# Patient Record
Sex: MALE | Race: WHITE | NOT HISPANIC OR LATINO | Employment: OTHER | URBAN - NONMETROPOLITAN AREA
[De-identification: names, ages, dates, MRNs, and addresses within clinical notes are randomized per-mention and may not be internally consistent; named-entity substitution may affect disease eponyms.]

---

## 2017-02-02 ENCOUNTER — OFFICE VISIT (OUTPATIENT)
Dept: CARDIOLOGY | Facility: CLINIC | Age: 82
End: 2017-02-02
Payer: MEDICARE

## 2017-02-02 VITALS
WEIGHT: 197 LBS | HEART RATE: 80 BPM | BODY MASS INDEX: 26.68 KG/M2 | DIASTOLIC BLOOD PRESSURE: 88 MMHG | HEIGHT: 72 IN | SYSTOLIC BLOOD PRESSURE: 142 MMHG | OXYGEN SATURATION: 90 %

## 2017-02-02 DIAGNOSIS — I10 ESSENTIAL HYPERTENSION, BENIGN: ICD-10-CM

## 2017-02-02 DIAGNOSIS — I50.32 CHF (CONGESTIVE HEART FAILURE), NYHA CLASS III, CHRONIC, DIASTOLIC (HCC): ICD-10-CM

## 2017-02-02 DIAGNOSIS — I48.21 PERMANENT ATRIAL FIBRILLATION (HCC): ICD-10-CM

## 2017-02-02 DIAGNOSIS — I35.0 AORTIC STENOSIS, MODERATE: ICD-10-CM

## 2017-02-02 DIAGNOSIS — I34.0 MODERATE MITRAL REGURGITATION: ICD-10-CM

## 2017-02-02 PROBLEM — I50.9 CHF (CONGESTIVE HEART FAILURE), NYHA CLASS III (HCC): Status: ACTIVE | Noted: 2017-02-02

## 2017-02-02 PROCEDURE — G8420 CALC BMI NORM PARAMETERS: HCPCS | Performed by: INTERNAL MEDICINE

## 2017-02-02 PROCEDURE — 99204 OFFICE O/P NEW MOD 45 MIN: CPT | Performed by: INTERNAL MEDICINE

## 2017-02-02 PROCEDURE — 1101F PT FALLS ASSESS-DOCD LE1/YR: CPT | Mod: 8P | Performed by: INTERNAL MEDICINE

## 2017-02-02 RX ORDER — FUROSEMIDE 20 MG/1
40 TABLET ORAL DAILY
Qty: 60 TAB | Refills: 6 | Status: SHIPPED | OUTPATIENT
Start: 2017-02-02 | End: 2018-05-09 | Stop reason: SDUPTHER

## 2017-02-02 RX ORDER — LEVOTHYROXINE SODIUM 112 UG/1
112 TABLET ORAL
COMMUNITY

## 2017-02-02 RX ORDER — POTASSIUM CHLORIDE 20 MEQ/1
20 TABLET, EXTENDED RELEASE ORAL 2 TIMES DAILY
Qty: 60 TAB | Refills: 11 | Status: SHIPPED | OUTPATIENT
Start: 2017-02-02 | End: 2018-02-14 | Stop reason: SDUPTHER

## 2017-02-02 RX ORDER — LANOLIN ALCOHOL/MO/W.PET/CERES
3 CREAM (GRAM) TOPICAL
COMMUNITY
End: 2018-03-14

## 2017-02-02 ASSESSMENT — ENCOUNTER SYMPTOMS
BRUISES/BLEEDS EASILY: 0
PND: 0
FALLS: 0
PALPITATIONS: 0
NAUSEA: 0
CHILLS: 0
CLAUDICATION: 0
FEVER: 0
SHORTNESS OF BREATH: 1
DIZZINESS: 1
ABDOMINAL PAIN: 0
LOSS OF CONSCIOUSNESS: 0
HEADACHES: 0
FOCAL WEAKNESS: 0
WEAKNESS: 0
SORE THROAT: 0
BLURRED VISION: 0
COUGH: 0

## 2017-02-02 NOTE — MR AVS SNAPSHOT
"        Reynaldo EASTON Khai   2017 2:00 PM   Office Visit   MRN: [id number]    Department:  Heart [facility]   Dept Phone:  [phone]    Description:  Male : 1928   Provider:  Momo Coon M.D.           Reason for Visit     New Patient           Allergies as of 2017     No Known Allergies      You were diagnosed with     CHF (congestive heart failure), NYHA class III, chronic, diastolic (CMS-HCC)   [[id number]]         Vital Signs     Blood Pressure Pulse Height Weight Body Mass Index Oxygen Saturation    142/88 mmHg 80 1.829 m (6' 0.01\") 89.359 kg (197 lb) 26.71 kg/m2 90%    Smoking Status                   Never Smoker            Basic Information     Date Of Birth Sex Race Ethnicity Preferred Language    1928 Male Unknown Unknown English      Your appointments     Mar 08, 2017 10:00 AM   15 Minute with Espinoza Syed M.D.   [facility] (--)    [address]  [facility] [address]   [phone]              Problem List              ICD-10-CM Priority Class Noted - Resolved    OSTEOPOROSIS    Unknown - Present    Diabetes mellitus, type II (CMS-HCC) E11.9   Unknown - Present    Hypertension I10   Unknown - Present    Parkinson's syndrome (CMS-HCC) G20   Unknown - Present    Hyperlipidemia E78.5   2014 - Present    Hypothyroidism E03.9   2014 - Present    DM (diabetes mellitus) (CMS-HCC) E11.9   2014 - Present    PAF (paroxysmal atrial fibrillation) (CMS-HCC) I48.0   2014 - Present    Aortic stenosis I35.0   2014 - Present      Health Maintenance        Date Due Completion Dates    DIABETES MONOFILAMENT / LE EXAM 1929 ---    FASTING LIPID PROFILE 1946 ---    URINE ACR / MICROALBUMIN 1946 ---    IMM DTaP/Tdap/Td Vaccine (1 - Tdap) 1947 ---    COLONOSCOPY 1978 ---    IMM ZOSTER VACCINE 1988 ---    A1C SCREENING 2013 (Done)    Override on 2013: Done    SERUM CREATININE " 2/22/2014 2/22/2013 (Done)    Override on 2/22/2013: Done    IMM PNEUMOCOCCAL 65+ (ADULT) LOW/MEDIUM RISK SERIES (2 of 2 - PCV13) 2/22/2014 2/22/2013    RETINAL SCREENING 2/28/2014 2/28/2013 (Done)    Override on 2/28/2013: Done (Roderick exact date unk.)    IMM INFLUENZA (1) 9/1/2016 ---            Current Immunizations     Pneumococcal polysaccharide vaccine (PPSV-23) 2/22/2013      Below and/or attached are the medications your provider expects you to take. Review all of your home medications and newly ordered medications with your provider and/or pharmacist. Follow medication instructions as directed by your provider and/or pharmacist. Please keep your medication list with you and share with your provider. Update the information when medications are discontinued, doses are changed, or new medications (including over-the-counter products) are added; and carry medication information at all times in the event of emergency situations     Allergies:  No Known Allergies          Medications  Valid as of: February 02, 2017 -  3:43 PM    Generic Name Brand Name Tablet Size Instructions for use    Alendronate Sodium (Tab) FOSAMAX 70 MG Take 70 mg by mouth every 7 days.        Aspirin (Tablet Delayed Response) ECOTRIN 81 MG Take 162 mg by mouth every day.        Aspirin (Tab)  MG Take 325 mg by mouth every 6 hours as needed.        Calcium Carbonate-Vitamin D (Tab) Calcium Carbonate-Vitamin D 600-200 MG-UNIT Take  by mouth every day.        Carbidopa-Levodopa (Tab) SINEMET  MG Take 1 Tab by mouth 4 times a day.        Carbidopa-Levodopa (Tab CR) SINEMET CR  MG Take 1 Tab by mouth every bedtime.        Cholecalciferol (Cap) Vitamin D3 1000 UNITS Take  by mouth.        Cyanocobalamin (Cap) B-12 1000 MCG Take 1,000 mcg by mouth every day.        Docusate Calcium   Take  by mouth.        Furosemide (Tab) LASIX 20 MG Take 2 Tabs by mouth every day. 40 mg daily for a week then 20 mg daily        GlipiZIDE  (Tab) GLUCOTROL 5 MG Take 5 mg by mouth 2 times a day. 1 am and 1/2 pm        GlyBURIDE (Tab) DIABETA 5 MG Take 5 mg by mouth every morning.        GlyBURIDE (Tab) DIABETA 2.5 MG Take 2.5 mg by mouth every evening.        Ketoconazole (Cream) NIZORAL 2 % Bid to area        Levothyroxine Sodium (Tab) SYNTHROID 88 MCG Take 88 mcg by mouth every day. 1/2 tab every morning        Levothyroxine Sodium (Tab) SYNTHROID 112 MCG Take 112 mcg by mouth Every morning on an empty stomach.        Melatonin (Tab) melatonin 3 MG Take 3 mg by mouth every bedtime.        MetFORMIN HCl (Tab) GLUCOPHAGE 1000 MG Take 1,000 mg by mouth 2 times a day, with meals.        Multiple Vitamins-Minerals   Take  by mouth.        Polyethylene Glycol 3350 (Pack) MIRALAX  Take 17 g by mouth every day.        Potassium Chloride Rhiannon CR (Tab CR) Kdur 20 MEQ Take 1 Tab by mouth 2 times a day.        RaNITidine HCl (Tab) ZANTAC 150 MG Take 150 mg by mouth 2 times a day.        Rivastigmine (PATCH 24 HR) EXELON 4.6 MG/24HR Apply 1 Patch to skin as directed every day.        Simvastatin (Tab) ZOCOR 10 MG Take 10 mg by mouth every evening.        Valerian   Take  by mouth.        .                 Medicines prescribed today were sent to:     [facility] [address] [address]    Phone: [phone] Fax: [phone]    Open 24 Hours?: No      Medication refill instructions:       If your prescription bottle indicates you have medication refills left, it is not necessary to call your provider’s office. Please contact your pharmacy and they will refill your medication.    If your prescription bottle indicates you do not have any refills left, you may request refills at any time through one of the following ways: The online [employer] system (except Urgent Care), by calling your provider’s office, or by asking your pharmacy to contact your provider’s office with a refill request. Medication  refills are processed only during regular business hours and may not be available until the next business day. Your provider may request additional information or to have a follow-up visit with you prior to refilling your medication.   *Please Note: Medication refills are assigned a new Rx number when refilled electronically. Your pharmacy may indicate that no refills were authorized even though a new prescription for the same medication is available at the pharmacy. Please request the medicine by name with the pharmacy before contacting your provider for a refill.        Instructions    Start furosemide (lasix) with potassium    Goal weight is 180-185              Hiveoo Access Code: [email or web address]  Expires: 2/5/2017 11:48 AM    Your email address is not on file at [facility].  Email Addresses are required for you to sign up for Hiveoo, please contact [phone] to verify your personal information and to provide your email address prior to attempting to register for Hiveoo.    Reynaldo Ridley  [address] [place] [address]    Hiveoo  A secure, online tool to manage your health information     [facility]’s Hiveoo® is a secure, online tool that connects you to your personalized health information from the privacy of your home -- day or night - making it very easy for you to manage your healthcare. Once the activation process is completed, you can even access your medical information using the Hiveoo sreekanth, which is available for free in the Apple Sreekanth store or Google Play store.     To learn more about Hiveoo, visit [email or web address]    There are two levels of access available (as shown below):   My Chart Features  [facility] Primary Care Doctor [facility]  Specialists [facility]  Urgent  Care Non-[facility] Primary Care Doctor   Email your healthcare team securely and privately 24/7 X X X    Manage appointments: schedule your next appointment; view details of past/upcoming appointments X       Request prescription refills. X      View recent personal medical records, including lab and immunizations X X X X   View health record, including health history, allergies, medications X X X X   Read reports about your outpatient visits, procedures, consult and ER notes X X X X   See your discharge summary, which is a recap of your hospital and/or ER visit that includes your diagnosis, lab results, and care plan X X  X     How to register for UGOBE:  Once your e-mail address has been verified, follow the following steps to sign up for UGOBE.     1. Go to  [email or web address]  2. Click on the Sign Up Now box, which takes you to the New Member Sign Up page. You will need to provide the following information:  a. Enter your UGOBE Access Code exactly as it appears at the top of this page. (You will not need to use this code after you’ve completed the sign-up process. If you do not sign up before the expiration date, you must request a new code.)   b. Enter your date of birth.   c. Enter your home email address.   d. Click Submit, and follow the next screen’s instructions.  3. Create a UGOBE ID. This will be your UGOBE login ID and cannot be changed, so think of one that is secure and easy to remember.  4. Create a UGOBE password. You can change your password at any time.  5. Enter your Password Reset Question and Answer. This can be used at a later time if you forget your password.   6. Enter your e-mail address. This allows you to receive e-mail notifications when new information is available in UGOBE.  7. Click Sign Up. You can now view your health information.    For assistance activating your UGOBE account, call [phone]

## 2017-02-02 NOTE — Clinical Note
[facility] [address],   [address] [place] [address]  Phone: [phone]  Fax: [phone]              Reynaldo Ridley  12/31/1928    Encounter Date: 2/2/2017    Momo Coon M.D.          PROGRESS NOTE:  Subjective:   Reynaldo Ridley is a 88 y.o. male who presents today for follow-up of his moderate mixed valvular disease he had last seen in cardiology little less than 3 years ago.    He is known to have moderate aortic stenosis and on recent echocardiogram he has the same, he has moderate mitral regurgitation with pulmonary venous hypertension and moderate tricuspid regurgitation and has had progressive leg swelling causing some fatigue.    He has long-term Parkinson's disease [facility]    He has had atrial fibrillation for at least 3 years he believes he wasn't recommended anticoagulation    Past Medical History   Diagnosis Date   • Vertigo      [facility]   • CATARACT    • Diabetes mellitus type II    • Moderate aortic valve stenosis 2/13     ECHO   • Hypertension    • Ulcer (CMS-[facility])    • Unspecified hypothyroidism    • Environmental allergies    • Melanoma (CMS-[facility])      head   • OSTEOPOROSIS    • Cognitive impairment    • Parkinson's syndrome (CMS-[facility])      [place]   • Hyperlipidemia 2/21/2014   • DM (diabetes mellitus) (CMS-[facility]) 2/21/2014   • PAF (paroxysmal atrial fibrillation) (CMS-[facility]) 2/21/2014   • Aortic stenosis 2/21/2014   • Aortic stenosis, moderate 2/21/2014   • Permanent atrial fibrillation (CMS-[facility]) 2/21/2014     Past Surgical History   Procedure Laterality Date   • Appendectomy       Family History   Problem Relation Age of Onset   • Depression Daughter    • GI Father      bowel obstruction   • Hypertension Mother    • Hypertension     • Arthritis       History   Smoking status   • Never Smoker    Smokeless tobacco   • Never Used     No Known Allergies  Outpatient Encounter  Prescriptions as of 2/2/2017   Medication Sig Dispense Refill   • melatonin 3 MG Tab Take 3 mg by mouth every bedtime.     • levothyroxine (SYNTHROID) 112 MCG Tab Take 112 mcg by mouth Every morning on an empty stomach.     • Cholecalciferol (VITAMIN D3) 1000 UNITS Cap Take  by mouth.     • VALERIAN ROOT PO Take  by mouth.     • potassium chloride SA (KDUR) 20 MEQ Tab CR Take 1 Tab by mouth 2 times a day. 60 Tab 11   • furosemide (LASIX) 20 MG Tab Take 2 Tabs by mouth every day. 40 mg daily for a week then 20 mg daily 60 Tab 6   • glipiZIDE (GLUCOTROL) 5 MG Tab Take 5 mg by mouth 2 times a day. 1 am and 1/2 pm     • aspirin (ASA) 325 MG Tab Take 325 mg by mouth every 6 hours as needed.     • Docusate Calcium (STOOL SOFTENER PO) Take  by mouth.     • metformin (GLUCOPHAGE) 1000 MG tablet Take 1,000 mg by mouth 2 times a day, with meals.     • ranitidine (ZANTAC) 150 MG TABS Take 150 mg by mouth 2 times a day.     • carbidopa-levodopa (SINEMET)  MG TABS Take 1 Tab by mouth 4 times a day.     • carbidopa-levodopa SR (SINEMET CR)  MG per tablet Take 1 Tab by mouth every bedtime.     • rivastigmine (EXELON) 4.6 MG/24HR patch Apply 1 Patch to skin as directed every day.     • Cyanocobalamin (B-12) 1000 MCG CAPS Take 1,000 mcg by mouth every day.     • ketoconazole (NIZORAL) 2 % Cream Bid to area 60 g 1   • polyethylene glycol/lytes (MIRALAX) Pack Take 17 g by mouth every day.     • glyBURIDE (DIABETA) 2.5 MG TABS Take 2.5 mg by mouth every evening.     • levothyroxine (SYNTHROID) 88 MCG TABS Take 88 mcg by mouth every day. 1/2 tab every morning     • alendronate (FOSAMAX) 70 MG TABS Take 70 mg by mouth every 7 days.     • glyBURIDE (DIABETA) 5 MG TABS Take 5 mg by mouth every morning.     • simvastatin (ZOCOR) 10 MG TABS Take 10 mg by mouth every evening.     • aspirin EC (ECOTRIN) 81 MG TBEC Take 162 mg by mouth every day.     • Calcium Carbonate-Vitamin D (CALCIUM + D) 600-200 MG-UNIT TABS Take  by mouth  "every day.     • Multiple Vitamins-Minerals (MULTIVITAMIN PO) Take  by mouth.       No facility-administered encounter medications on file as of 2/2/2017.     Review of Systems   Constitutional: Positive for malaise/fatigue. Negative for fever and chills.   HENT: Negative for sore throat.    Eyes: Negative for blurred vision.   Respiratory: Positive for shortness of breath. Negative for cough.    Cardiovascular: Positive for leg swelling. Negative for chest pain, palpitations, claudication and PND.   Gastrointestinal: Negative for nausea and abdominal pain.   Musculoskeletal: Negative for falls.   Skin: Negative for rash.   Neurological: Positive for dizziness. Negative for focal weakness, loss of consciousness, weakness and headaches.   Endo/Heme/Allergies: Does not bruise/bleed easily.        Objective:   /88 mmHg  Pulse 80  Ht 1.829 m (6' 0.01\")  Wt 89.359 kg (197 lb)  BMI 26.71 kg/m2  SpO2 90%    Physical Exam   Constitutional: No distress.   HENT:   Mouth/Throat: Oropharynx is clear and moist.   Eyes: No scleral icterus.   Neck: Neck supple. No JVD present.   Cardiovascular: Normal rate and intact distal pulses.  An irregularly irregular rhythm present. Exam reveals no gallop and no friction rub.    Murmur heard.  Pulmonary/Chest: Effort normal. He has no rales.   Abdominal: Soft. Bowel sounds are normal. There is no tenderness.   Musculoskeletal: He exhibits no edema.   Neurological: He is alert.   Skin: No rash noted. He is not diaphoretic.   Psychiatric: He has a normal mood and affect.     I reviewed the images from his recent echocardiogram which showed calcified valves causing moderate aortic stenosis, mild aortic regurgitation, moderate mitral regurgitation, moderate tricuspid regurgitation with elevated pulmonary pressures estimated around 80    I don't have recent lab work [facility]    Assessment:     1. CHF (congestive heart failure), NYHA class III, chronic, " diastolic (CMS-[facility])  potassium chloride SA (KDUR) 20 MEQ Tab CR    furosemide (LASIX) 20 MG Tab   2. Moderate mitral regurgitation     3. Aortic stenosis, moderate     4. Permanent atrial fibrillation (CMS-[facility])     5. Essential hypertension, benign         Medical Decision Making:  Today's Assessment / Status / Plan:     It was my pleasure to meet with Mr. Ridley.    He is accompanied by his wife and daughter    His symptoms are most consistent with mixed valvular disease likely most importantly moderate mitral regurgitation which has led to pulmonary venous congestion and right heart failure symptoms with preserved ejection fraction.  We'll start him on diuretic he should lose weight according to await records his ideal weight is likely around 180 pounds so he has about 70 pounds to lose.    Given his moderate [id number] score he is definitely recommended anticoagulation. He agrees with this and [facility].  Given his valvular disease is moderate he could safely start one of the newer targeted oral anticoagulants such as Pradaxa or Eliquis.  You could also take Coumadin but is difficult to get out of the house so will need home monitoring likely.  I don't have his kidney function so certainly this will need to be checked for decision on dosing of the other agents.    I will see Mr. Ridley back in 3 months time and encouraged him to follow up with us over the phone or e-mail using my MyChart as issues arise.    It is my pleasure to participate in the care of Mr. Ridley.  Please do not hesitate to contact me with questions or concerns.    Momo Coon MD PhD [facility]  Cardiologist [facility]            Peyton Hair M.D.  [address]  [address]  [facility] [address]  VIA Facsimile: [phone]     Espinoza Syed M.D.  [address] [address]  VIA In Basket

## 2017-02-03 NOTE — PROGRESS NOTES
Subjective:   Reynaldo Ridley is a 88 y.o. male who presents today for follow-up of his moderate mixed valvular disease he had last seen in cardiology little less than 3 years ago.    He is known to have moderate aortic stenosis and on recent echocardiogram he has the same, he has moderate mitral regurgitation with pulmonary venous hypertension and moderate tricuspid regurgitation and has had progressive leg swelling causing some fatigue.    He has long-term Parkinson's disease and [facility]    He has had atrial fibrillation for at least 3 years he believes he wasn't recommended anticoagulation    Past Medical History   Diagnosis Date   • Vertigo      [facility]   • CATARACT    • Diabetes mellitus type II    • Moderate aortic valve stenosis 2/13     ECHO   • Hypertension    • Ulcer (CMS-HCC)    • Unspecified hypothyroidism    • Environmental allergies    • Melanoma (CMS-HCC)      head   • OSTEOPOROSIS    • Cognitive impairment    • Parkinson's syndrome (CMS-HCC)      [facility]   • Hyperlipidemia 2/21/2014   • DM (diabetes mellitus) (CMS-HCC) 2/21/2014   • PAF (paroxysmal atrial fibrillation) (CMS-HCC) 2/21/2014   • Aortic stenosis 2/21/2014   • Aortic stenosis, moderate 2/21/2014   • Permanent atrial fibrillation (CMS-HCC) 2/21/2014     Past Surgical History   Procedure Laterality Date   • Appendectomy       Family History   Problem Relation Age of Onset   • Depression Daughter    • GI Father      bowel obstruction   • Hypertension Mother    • Hypertension     • Arthritis       History   Smoking status   • Never Smoker    Smokeless tobacco   • Never Used     No Known Allergies  Outpatient Encounter Prescriptions as of 2/2/2017   Medication Sig Dispense Refill   • melatonin 3 MG Tab Take 3 mg by mouth every bedtime.     • levothyroxine (SYNTHROID) 112 MCG Tab Take 112 mcg by mouth Every morning on an empty stomach.     • Cholecalciferol (VITAMIN D3) 1000 UNITS Cap Take  by mouth.     • VALERIAN  ROOT PO Take  by mouth.     • potassium chloride SA (KDUR) 20 MEQ Tab CR Take 1 Tab by mouth 2 times a day. 60 Tab 11   • furosemide (LASIX) 20 MG Tab Take 2 Tabs by mouth every day. 40 mg daily for a week then 20 mg daily 60 Tab 6   • glipiZIDE (GLUCOTROL) 5 MG Tab Take 5 mg by mouth 2 times a day. 1 am and 1/2 pm     • aspirin (ASA) 325 MG Tab Take 325 mg by mouth every 6 hours as needed.     • Docusate Calcium (STOOL SOFTENER PO) Take  by mouth.     • metformin (GLUCOPHAGE) 1000 MG tablet Take 1,000 mg by mouth 2 times a day, with meals.     • ranitidine (ZANTAC) 150 MG TABS Take 150 mg by mouth 2 times a day.     • carbidopa-levodopa (SINEMET)  MG TABS Take 1 Tab by mouth 4 times a day.     • carbidopa-levodopa SR (SINEMET CR)  MG per tablet Take 1 Tab by mouth every bedtime.     • rivastigmine (EXELON) 4.6 MG/24HR patch Apply 1 Patch to skin as directed every day.     • Cyanocobalamin (B-12) 1000 MCG CAPS Take 1,000 mcg by mouth every day.     • ketoconazole (NIZORAL) 2 % Cream Bid to area 60 g 1   • polyethylene glycol/lytes (MIRALAX) Pack Take 17 g by mouth every day.     • glyBURIDE (DIABETA) 2.5 MG TABS Take 2.5 mg by mouth every evening.     • levothyroxine (SYNTHROID) 88 MCG TABS Take 88 mcg by mouth every day. 1/2 tab every morning     • alendronate (FOSAMAX) 70 MG TABS Take 70 mg by mouth every 7 days.     • glyBURIDE (DIABETA) 5 MG TABS Take 5 mg by mouth every morning.     • simvastatin (ZOCOR) 10 MG TABS Take 10 mg by mouth every evening.     • aspirin EC (ECOTRIN) 81 MG TBEC Take 162 mg by mouth every day.     • Calcium Carbonate-Vitamin D (CALCIUM + D) 600-200 MG-UNIT TABS Take  by mouth every day.     • Multiple Vitamins-Minerals (MULTIVITAMIN PO) Take  by mouth.       No facility-administered encounter medications on file as of 2/2/2017.     Review of Systems   Constitutional: Positive for malaise/fatigue. Negative for fever and chills.   HENT: Negative for sore throat.    Eyes:  "Negative for blurred vision.   Respiratory: Positive for shortness of breath. Negative for cough.    Cardiovascular: Positive for leg swelling. Negative for chest pain, palpitations, claudication and PND.   Gastrointestinal: Negative for nausea and abdominal pain.   Musculoskeletal: Negative for falls.   Skin: Negative for rash.   Neurological: Positive for dizziness. Negative for focal weakness, loss of consciousness, weakness and headaches.   Endo/Heme/Allergies: Does not bruise/bleed easily.        Objective:   /88 mmHg  Pulse 80  Ht 1.829 m (6' 0.01\")  Wt 89.359 kg (197 lb)  BMI 26.71 kg/m2  SpO2 90%    Physical Exam   Constitutional: No distress.   HENT:   Mouth/Throat: Oropharynx is clear and moist.   Eyes: No scleral icterus.   Neck: Neck supple. No JVD present.   Cardiovascular: Normal rate and intact distal pulses.  An irregularly irregular rhythm present. Exam reveals no gallop and no friction rub.    Murmur heard.  Pulmonary/Chest: Effort normal. He has no rales.   Abdominal: Soft. Bowel sounds are normal. There is no tenderness.   Musculoskeletal: He exhibits no edema.   Neurological: He is alert.   Skin: No rash noted. He is not diaphoretic.   Psychiatric: He has a normal mood and affect.     I reviewed the images from his recent echocardiogram which showed calcified valves causing moderate aortic stenosis, mild aortic regurgitation, moderate mitral regurgitation, moderate tricuspid regurgitation with elevated pulmonary pressures estimated around 80    I don't have recent lab work [facility]    Assessment:     1. CHF (congestive heart failure), NYHA class III, chronic, diastolic (CMS-HCC)  potassium chloride SA (KDUR) 20 MEQ Tab CR    furosemide (LASIX) 20 MG Tab   2. Moderate mitral regurgitation     3. Aortic stenosis, moderate     4. Permanent atrial fibrillation (CMS-[facility])     5. Essential hypertension, benign         Medical Decision Making:  Today's Assessment " / Status / Plan:     It was my pleasure to meet with Mr. Ridley.    He is accompanied by his wife and daughter    His symptoms are most consistent with mixed valvular disease likely most importantly moderate mitral regurgitation which has led to pulmonary venous congestion and right heart failure symptoms with preserved ejection fraction.  We'll start him on diuretic he should lose weight according to await records his ideal weight is likely around 180 pounds so he has about 70 pounds to lose.    Given his moderate [id number] score he is definitely recommended anticoagulation. He agrees with this and [facility].  Given his valvular disease is moderate he could safely start one of the newer targeted oral anticoagulants such as Pradaxa or Eliquis.  You could also take Coumadin but is difficult to get out of the house so will need home monitoring likely.  I don't have his kidney function so certainly this will need to be checked for decision on dosing of the other agents.    I will see Mr. Ridley back in 3 months time and encouraged him to follow up with us over the phone or e-mail using my MyChart as issues arise.    It is my pleasure to participate in the care of Mr. Ridley.  Please do not hesitate to contact me with questions or concerns.    Momo Coon MD PhD [facility]  Cardiologist [facility]

## 2017-02-07 ENCOUNTER — TELEPHONE (OUTPATIENT)
Dept: CARDIOLOGY | Facility: MEDICAL CENTER | Age: 82
End: 2017-02-07

## 2017-02-07 NOTE — TELEPHONE ENCOUNTER
----- Message from Brittany Kumar sent at 2/7/2017  8:37 AM PST -----  Regarding: Luly @ [facility] calling for clarification on A-fib  JULIO/Sherrie Mauricio @ [facility] is calling to clarify if the patient has valvular or non-valvular A-fib. She can be reached at [phone].

## 2017-02-07 NOTE — TELEPHONE ENCOUNTER
Spoke with Luly who states it will be her decision (Dr. aHir is out for awhile) as to what anticoagulant the patient is placed on.  She wants a specific answer to, is the patient's Afib valvular or non valvular?  She has the chart note from 2/2, but is concerned about using Pradaxa or Eliquis as stated in the note.  To JULIO MACIEL to please advise.

## 2017-02-07 NOTE — TELEPHONE ENCOUNTER
Momo Coon M.D.  Minal Arrington R.N.        Caller: Unspecified (Today, 9:01 AM)                     His is technically considered nonvalvular safe to take the newer oral anticoagulants            Javier Mauricio notified and is thankful for the call.